# Patient Record
Sex: FEMALE | Race: ASIAN | Employment: FULL TIME | URBAN - METROPOLITAN AREA
[De-identification: names, ages, dates, MRNs, and addresses within clinical notes are randomized per-mention and may not be internally consistent; named-entity substitution may affect disease eponyms.]

---

## 2018-06-13 PROBLEM — D25.9 LEIOMYOMA OF BODY OF UTERUS: Status: ACTIVE | Noted: 2018-06-13

## 2019-04-23 ENCOUNTER — HOSPITAL ENCOUNTER (OUTPATIENT)
Dept: PHYSICAL THERAPY | Age: 39
Discharge: HOME | End: 2019-04-23
Payer: COMMERCIAL

## 2019-04-23 PROCEDURE — 97162 PT EVAL MOD COMPLEX 30 MIN: CPT
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
## 2019-04-25 ENCOUNTER — HOSPITAL ENCOUNTER (OUTPATIENT)
Dept: PHYSICAL THERAPY | Age: 39
Discharge: HOME | End: 2019-04-25
Payer: COMMERCIAL

## 2019-04-25 PROCEDURE — 97110 THERAPEUTIC EXERCISES: CPT

## 2019-04-25 PROCEDURE — 97014 ELECTRIC STIMULATION THERAPY: CPT

## 2019-04-25 PROCEDURE — 97140 MANUAL THERAPY 1/> REGIONS: CPT
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
## 2019-05-01 ENCOUNTER — HOSPITAL ENCOUNTER (OUTPATIENT)
Dept: PHYSICAL THERAPY | Age: 39
Discharge: HOME | End: 2019-05-01
Payer: COMMERCIAL

## 2019-05-01 PROCEDURE — 97140 MANUAL THERAPY 1/> REGIONS: CPT

## 2019-05-01 PROCEDURE — 97014 ELECTRIC STIMULATION THERAPY: CPT

## 2019-05-01 PROCEDURE — 97110 THERAPEUTIC EXERCISES: CPT
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
## 2019-05-01 NOTE — PROGRESS NOTES
PT DAILY TREATMENT NOTE - [facility]     Patient Name: Jimena Metcalf  Date:2019  : 1980  [x]  Patient  Verified  Payor: BLUE CROSS / Plan: Ricardo Sharp [id number] PPO / Product Type: PPO /    In time: 209  Out time: 1025  Total Treatment Time (min): 63  Total Timed Codes (min): 48  1:1 Treatment Time ( only): 45   Visit #: 3 of     Treatment Area: Low back pain [M54.5]  Neck pain [M54.2]    SUBJECTIVE  Pain Level IN:(0-10 scale):  6-7/10   Pain Level OUT: (0-10 scale) post treatment: 4/10 neck 0/10 low back    Any medication changes, allergies to medications, adverse drug reactions, diagnosis change, or new procedure performed?: [x] No    [] Yes (see summary sheet for update)  Subjective functional status/changes:   [] No changes reported  I definitely feel like the exercises are helping to decrease my pain     OBJECTIVE  Modalities Rationale:     decrease edema, decrease inflammation, decrease pain and increase tissue extensibility to improve patient's ability to perform ADL's without increase in pain  15 min [x] Estim, type/location:  Seated PREMOD to Upper trap and cervical paraspinals                                    []  att     [x]  unatt     []  w/US     []  w/ice    [x]  w/heat    min []  Mechanical Traction: type/lbs                   []  pro   []  sup   []  int   []  cont    []  before manual    []  after manual    min []  Ultrasound, settings/location:      min []  Iontophoresis w/ dexamethasone, location:                                               []  take home patch       []  in clinic    min []  Ice     []  Heat    location/position:     min []  Vasopneumatic Device, press/temp:     min []  Other:    [x] Skin assessment post-treatment (if applicable):    [x]  intact    []  redness- no adverse reaction     []redness - adverse reaction:        33/28  min Therapeutic Exercise:  [x] See flow sheet : 5 min NC for warm up     Rationale: increase ROM and increase strength to improve the patients ability to perform functional ADL's       15  min Manual Therapy:   Cervical spine PROM, DTM / MYOR along C PS, UTs, Scalenes B. SOR    Rationale: decrease pain, increase ROM, increase tissue extensibility and decrease trigger points to allow patient to tolerate ADL's without increase in pain. With   [] TE   [] TA   [] neuro   [] other: Patient Education: [x] Review HEP    [] Progressed/Changed HEP based on:   [] positioning   [] body mechanics   [] transfers   [] heat/ice application    [] other:      Objective/Functional Measures with Therapist Assessment Noted with Response to Therapy Session:  Patient demonstrated good recall of neck and back therex from last session  Large Trigger point along Right Superior border of scapula    ASSESSMENT/Changes in Function: Patient is responding well to therapeutic interventions with decreased pain reported post PT session. Patient will continue to benefit from skilled PT services to modify and progress therapeutic interventions, address functional mobility deficits, address ROM deficits, address strength deficits, analyze and address soft tissue restrictions and analyze and modify body mechanics/ergonomics to attain remaining goals. []  See Plan of Care  []  See progress note/recertification  []  See Discharge Summary         Progress towards goals / Updated goals:  1. Patient will demonstrate I and compliance with HEP to increase cervical ROM, increase scapular strength and improve core strength to demonstrate active role in rehab process.      · Long Term Goals: To be accomplished in  8-12  treatments:  1. Patient will increase AROM to Cervical spine to WNL with B side bending and rotation without an increase in pain to allow for increased ease for driving. 2. Patient will increase B scapular strength to 5/5 to allow for decreased pain with prolonged sitting and allow for proper postural alignment.    3. Patient will increase FOTO score to at least 63/100 to indicate improved I with functional mobility. 4. Patient will report decreased pain levels to 2/10 during and after activity to allow for return to PLOF. Ongoing 4/10 neck and 0/10 back at end of session 5/1/2019  5.  Patient will demonstrate 100% bridge and forward flexion therex without c/o increased pain.      PLAN  [x]  Upgrade activities as tolerated     [x]  Continue plan of care  []  Update interventions per flow sheet       []  Discharge due to:_  []  Other:_      Vickie May, PT 5/1/2019  10:10 AM    Future Appointments   Date Time Provider Aure Tapia   5/3/2019 11:30 AM Lenetta July, PTA [facility]   5/7/2019 11:30 AM Lenetta July, PTA [facility]   5/10/2019 12:15 PM Lenetta July, PTA [facility]   5/14/2019 10:00 AM Lenetta July, PTA [facility]   5/16/2019 10:00 AM Lenetta July, PTA [facility]   5/20/2019 10:00 AM Lenetta July, PTA [facility]   5/23/2019 10:00 AM Lenetta July, PTA [facility]   5/28/2019 10:00 AM Lenetta July, PTA [facility]   5/31/2019 11:30 AM Gaurav Patel, PTA [facility]

## 2019-05-03 ENCOUNTER — HOSPITAL ENCOUNTER (OUTPATIENT)
Dept: PHYSICAL THERAPY | Age: 39
Discharge: HOME | End: 2019-05-03
Payer: COMMERCIAL

## 2019-05-03 PROCEDURE — 97014 ELECTRIC STIMULATION THERAPY: CPT

## 2019-05-03 PROCEDURE — 97140 MANUAL THERAPY 1/> REGIONS: CPT

## 2019-05-03 PROCEDURE — 97110 THERAPEUTIC EXERCISES: CPT

## 2019-05-03 NOTE — PROGRESS NOTES
PT DAILY TREATMENT NOTE - [facility]     Patient Name: Vandana Longest  Date:5/3/2019  : 1980  [x]  Patient  Verified  Payor: BLUE CROSS / Plan: Ricardo Sharp [id number] PPO / Product Type: PPO /    In time: 11:40  Out time: 12:50  Total Treatment Time (min): 70  Total Timed Codes (min): 55  1:1 Treatment Time ( only): 50  Visit #: 4 of     Treatment Area: Low back pain [M54.5]  Neck pain [M54.2]    SUBJECTIVE  Pain Level IN:(0-10 scale):  5/10 neck; soreness for back   Pain Level OUT: (0-10 scale) post treatment: 3/10 neck and 0/10 back     Any medication changes, allergies to medications, adverse drug reactions, diagnosis change, or new procedure performed?: [x] No    [] Yes (see summary sheet for update)  Subjective functional status/changes:   [] No changes reported     I have this pain to the back of my head - both sides    OBJECTIVE  Modalities Rationale:     decrease edema, decrease inflammation, decrease pain and increase tissue extensibility to improve patient's ability to perform ADL's without increase in pain  15 min [x] Estim, type/location:  Seated PREMOD to Upper trap and cervical paraspinals                                    []  att     [x]  unatt     []  w/US     []  w/ice    [x]  w/heat    min []  Mechanical Traction: type/lbs                   []  pro   []  sup   []  int   []  cont    []  before manual    []  after manual    min []  Ultrasound, settings/location:      min []  Iontophoresis w/ dexamethasone, location:                                               []  take home patch       []  in clinic    min []  Ice     []  Heat    location/position:     min []  Vasopneumatic Device, press/temp:     min []  Other:    [x] Skin assessment post-treatment (if applicable):    [x]  intact    []  redness- no adverse reaction     []redness - adverse reaction:        40/35  min Therapeutic Exercise:  [x] See flow sheet : 5 min NC for warm up     Rationale: increase ROM and increase strength to improve the patients ability to perform functional ADL's       15  min Manual Therapy:   Cervical spine PROM, DTM / MYOR along C PS, UTs, Scalenes B. SOR    rotation lesion to C2-4 - performed MET to correct    Rationale: decrease pain, increase ROM, increase tissue extensibility and decrease trigger points to allow patient to tolerate ADL's without increase in pain. With   [] TE   [] TA   [] neuro   [] other: Patient Education: [x] Review HEP    [] Progressed/Changed HEP based on:   [] positioning   [] body mechanics   [] transfers   [] heat/ice application    [] other:      Objective/Functional Measures with Therapist Assessment Noted with Response to Therapy Session:  Patient presents with some tightness to (B) suboccipital/cervical paraspinals   And rotation lesion to C2-4 - performed MET to correct         ASSESSMENT/Changes in Function:  Patient will continue to benefit from skilled PT services to modify and progress therapeutic interventions, address functional mobility deficits, address ROM deficits, address strength deficits, analyze and address soft tissue restrictions and analyze and modify body mechanics/ergonomics to attain remaining goals. []  See Plan of Care  [x]  See progress note/recertification  []  See Discharge Summary         Progress towards goals / Updated goals:  1. Patient will demonstrate I and compliance with HEP to increase cervical ROM, increase scapular strength and improve core strength to demonstrate active role in rehab process.      · Long Term Goals: To be accomplished in  8-12  treatments:  1. Patient will increase AROM to Cervical spine to WNL with B side bending and rotation without an increase in pain to allow for increased ease for driving. 2. Patient will increase B scapular strength to 5/5 to allow for decreased pain with prolonged sitting and allow for proper postural alignment.    3. Patient will increase FOTO score to at least 63/100 to indicate improved I with functional mobility. 4. Patient will report decreased pain levels to 2/10 during and after activity to allow for return to PLOF. Ongoing 4/10 neck and 0/10 back at end of session 5/1/2019  5.  Patient will demonstrate 100% bridge and forward flexion therex without c/o increased pain.      PLAN  [x]  Upgrade activities as tolerated     [x]  Continue plan of care  []  Update interventions per flow sheet       []  Discharge due to:_  []  Other:_      Ghulam Calderon PTA 5/3/2019  10:10 AM    Future Appointments   Date Time Provider Aure Tapia   5/7/2019 11:30 AM Michael Swan PTA [facility]   5/10/2019 12:15 PM Michael Swan PTA [facility]   5/14/2019 10:00 AM Michael Swan PTA [facility]   5/16/2019 10:00 AM Michael Swna PTA [facility]   5/20/2019 10:00 AM Michael Swan PTA [facility]   5/23/2019 10:00 AM Michael Swan PTA [facility]   5/28/2019 10:00 AM Michael Swan PTA [facility]   5/31/2019 11:30 AM Sincere Ruiz PTA [facility]

## 2019-05-07 ENCOUNTER — HOSPITAL ENCOUNTER (OUTPATIENT)
Dept: PHYSICAL THERAPY | Age: 39
Discharge: HOME | End: 2019-05-07
Payer: COMMERCIAL

## 2019-05-07 PROCEDURE — 97140 MANUAL THERAPY 1/> REGIONS: CPT

## 2019-05-07 PROCEDURE — 97110 THERAPEUTIC EXERCISES: CPT

## 2019-05-07 PROCEDURE — 97014 ELECTRIC STIMULATION THERAPY: CPT

## 2019-05-07 NOTE — PROGRESS NOTES
PT DAILY TREATMENT NOTE - [facility]     Patient Name: Jeancarlos Jain  Date:2019  : 1980  [x]  Patient  Verified  Payor: BLUE CROSS / Plan: [facility] PPO / Product Type: PPO /    In time:11:40  Out time:12:50  Total Treatment Time (min): 70  Total Timed Codes (min): 55  1:1 Treatment Time ( only):  50  Visit #: 5 of     Treatment Area: Low back pain [M54.5]  Neck pain [M54.2]    SUBJECTIVE  Pain Level IN:(0-10 scale): 6/10 in neck and soreness in LB  Pain Level OUT: (0-10 scale) post treatment: 5/10 in neck, 1/10 in LB    Any medication changes, allergies to medications, adverse drug reactions, diagnosis change, or new procedure performed?: [x] No    [] Yes (see summary sheet for update)  Subjective functional status/changes:   [] No changes reported  Patient states she has achy pain mostly on right side of neck, and soreness in LB    OBJECTIVE    Modalities Rationale:     decrease edema, decrease inflammation, decrease pain and increase tissue extensibility to improve patient's ability to perform ADL's without increase in pain  15 min [x] Estim, type/location:  PREMOD to bilat UT and cervical paraspinals                                    []  att     [x]  unatt     []  w/US     []  w/ice    [x]  w/heat    min []  Mechanical Traction: type/lbs                   []  pro   []  sup   []  int   []  cont    []  before manual    []  after manual    min []  Ultrasound, settings/location:      min []  Iontophoresis w/ dexamethasone, location:                                               []  take home patch       []  in clinic    min []  Ice     []  Heat    location/position:     min []  Vasopneumatic Device, press/temp:     min []  Other:    [] Skin assessment post-treatment (if applicable):    []  intact    []  redness- no adverse reaction     []redness - adverse reaction:        40/35 min Therapeutic Exercise:  [x] See flow sheet : 5 min NC for warmup   Rationale: increase ROM and increase strength to improve the patients ability to perform functional ADL's      15 min Manual Therapy:  MET to correct SI alignment, MET to correct left rotational lesion to C2-4. D/STM to UT, LS, SCM, and cervical paraspinals. TPR to bilat UT and right medial scap border   Rationale: decrease pain, increase ROM, increase tissue extensibility and decrease trigger points to tolerate ADL's without increase in pain              With   [] TE   [] TA   [] neuro   [] other: Patient Education: [x] Review HEP    [] Progressed/Changed HEP based on:   [] positioning   [] body mechanics   [] transfers   [] heat/ice application    [] other:      Objective/Functional Measures with Therapist Assessment Noted with Response to Therapy Session:  Increased repetitions in therex program. Good tolerance from patient, no increase in pain. Upon assessment of SI alignment, patient presented anteriorly rotated on left, posteriorly rotated on right. Corrected with MET during manual.  Patient presented with left rotational lesion of C2-4, corrected with MET  Patient still presenting with increase tightness/ tension in cervical paraspinals, UT, LS, and SCM, as well as trigger points in bilat UT and along medial border of right scap  Goal checked: 1. Patient will demonstrate I and compliance with HEP to increase cervical ROM, increase scapular strength and improve core strength to demonstrate active role in rehab process. Partial compliance. 5/7/2019    ASSESSMENT/Changes in Function:   Patient will continue to benefit from skilled PT services to modify and progress therapeutic interventions, address functional mobility deficits, address ROM deficits, address strength deficits, analyze and address soft tissue restrictions, analyze and cue movement patterns and analyze and modify body mechanics/ergonomics to attain remaining goals.      [x]  See Plan of Care  []  See progress note/recertification  []  See Discharge Summary         Progress towards goals / Updated goals:  1. Patient will demonstrate I and compliance with HEP to increase cervical ROM, increase scapular strength and improve core strength to demonstrate active role in rehab process. Partial compliance. 5/7/2019     · Long Term Goals: To be accomplished in  8-12  treatments:  1. Patient will increase AROM to Cervical spine to WNL with B side bending and rotation without an increase in pain to allow for increased ease for driving. 2. Patient will increase B scapular strength to 5/5 to allow for decreased pain with prolonged sitting and allow for proper postural alignment. 3. Patient will increase FOTO score to at least 63/100 to indicate improved I with functional mobility. 4. Patient will report decreased pain levels to 2/10 during and after activity to allow for return to PLOF. Ongoing 4/10 neck and 0/10 back at end of session 5/1/2019  5.  Patient will demonstrate 100% bridge and forward flexion therex without c/o increased pain.         PLAN  [x]  Upgrade activities as tolerated     [x]  Continue plan of care  []  Update interventions per flow sheet       []  Discharge due to:_  []  Other:_      Rubin Taveras 5/7/2019  12:23 PM    Future Appointments   Date Time Provider Aure Tapia   5/10/2019 12:15 PM Michael Swan PTA [facility]   5/14/2019 10:00 AM Michael Swan PTA [facility]   5/16/2019 10:00 AM Michael Swan PTA [facility]   5/20/2019 10:00 AM Michael Swan PTA [facility]   5/23/2019 10:00 AM Michael Swan PTA [facility]   5/28/2019 10:00 AM Michael Swan PTA [facility]   5/31/2019 11:30 AM Sincere Ruiz PTA [facility]

## 2019-05-10 ENCOUNTER — HOSPITAL ENCOUNTER (OUTPATIENT)
Dept: PHYSICAL THERAPY | Age: 39
End: 2019-05-10
Payer: COMMERCIAL

## 2019-05-14 ENCOUNTER — HOSPITAL ENCOUNTER (OUTPATIENT)
Dept: PHYSICAL THERAPY | Age: 39
Discharge: HOME | End: 2019-05-14
Payer: COMMERCIAL

## 2019-05-14 PROCEDURE — 97014 ELECTRIC STIMULATION THERAPY: CPT

## 2019-05-14 PROCEDURE — 97140 MANUAL THERAPY 1/> REGIONS: CPT

## 2019-05-14 PROCEDURE — 97110 THERAPEUTIC EXERCISES: CPT

## 2019-05-14 NOTE — PROGRESS NOTES
PT DAILY TREATMENT NOTE - [facility]     Patient Name: Vandana Longest  Date:2019  : 1980  [x]  Patient  Verified  Payor: BLUE CROSS / Plan: Ricardo Sharp [id number] PPO / Product Type: PPO /    In time:10:05  Out time:11:15  Total Treatment Time (min): 70  Total Timed Codes (min): 55  1:1 Treatment Time ( only):  45  Visit #: 6 of     Treatment Area: Low back pain [M54.5]  Neck pain [M54.2]    SUBJECTIVE  Pain Level IN:(0-10 scale): 4-5/10 in neck and soreness in LB  Pain Level OUT: (0-10 scale) post treatment: 2/10 in neck and 0/10 LB    Any medication changes, allergies to medications, adverse drug reactions, diagnosis change, or new procedure performed?: [x] No    [] Yes (see summary sheet for update)  Subjective functional status/changes:   [] No changes reported  It is really sore over the (R) side of my neck more than the left and my back is sore  OBJECTIVE    Modalities Rationale:     decrease edema, decrease inflammation, decrease pain and increase tissue extensibility to improve patient's ability to perform ADL's without increase in pain  15 min [x] Estim, type/location:  PREMOD to bilat UT and cervical paraspinals                                    []  att     [x]  unatt     []  w/US     []  w/ice    [x]  w/heat    min []  Mechanical Traction: type/lbs                   []  pro   []  sup   []  int   []  cont    []  before manual    []  after manual    min []  Ultrasound, settings/location:      min []  Iontophoresis w/ dexamethasone, location:                                               []  take home patch       []  in clinic    min []  Ice     []  Heat    location/position:     min []  Vasopneumatic Device, press/temp:     min []  Other:    [] Skin assessment post-treatment (if applicable):    []  intact    []  redness- no adverse reaction     []redness - adverse reaction:        40/35 min Therapeutic Exercise:  [x] See flow sheet : 5 min NC for warmup  Added piriformis stretch   [place] and band with bridge  2 way clams   Serratus punches in side lying at 90 (#2) and 120 (0#)   t-band rows and extension with blue t-band   Levator scapula stretch and scalenus stretch     Rationale: increase ROM and increase strength to improve the patients ability to perform functional ADL's      15 min Manual Therapy:  MET to correct SI alignment, MET to correct left rotational lesion to C2-4. D/STM to UT, LS, SCM, and cervical paraspinals. TPR to bilat UT and right medial scap border   Rationale: decrease pain, increase ROM, increase tissue extensibility and decrease trigger points to tolerate ADL's without increase in pain              With   [] TE   [] TA   [] neuro   [] other: Patient Education: [x] Review HEP    [] Progressed/Changed HEP based on:   [] positioning   [] body mechanics   [] transfers   [] heat/ice application    [] other:      Objective/Functional Measures with Therapist Assessment Noted with Response to Therapy Session:  See above increase exercise program to increase core stability/strength and increase flexibility and cervical movement with additional stretches and exercises  Patient continues to present with a slight cervical rotation lesion to the (R) to C2-4 and tightness to the (R) cervical paraspinals and SCM/scalenus     ASSESSMENT/Changes in Function:   Patient will continue to benefit from skilled PT services to modify and progress therapeutic interventions, address functional mobility deficits, address ROM deficits, address strength deficits, analyze and address soft tissue restrictions, analyze and cue movement patterns and analyze and modify body mechanics/ergonomics to attain remaining goals. [x]  See Plan of Care  []  See progress note/recertification  []  See Discharge Summary         Progress towards goals / Updated goals:  1.  Patient will demonstrate I and compliance with HEP to increase cervical ROM, increase scapular strength and improve core strength to demonstrate active role in rehab process. Partial compliance. 5/7/2019     · Long Term Goals: To be accomplished in  8-12  treatments:  1. Patient will increase AROM to Cervical spine to WNL with B side bending and rotation without an increase in pain to allow for increased ease for driving. 2. Patient will increase B scapular strength to 5/5 to allow for decreased pain with prolonged sitting and allow for proper postural alignment. 3. Patient will increase FOTO score to at least 63/100 to indicate improved I with functional mobility. 4. Patient will report decreased pain levels to 2/10 during and after activity to allow for return to PLOF. Ongoing 4/10 neck and 0/10 back at end of session 5/1/2019  5.  Patient will demonstrate 100% bridge and forward flexion therex without c/o increased pain.         PLAN  [x]  Upgrade activities as tolerated     [x]  Continue plan of care  []  Update interventions per flow sheet       []  Discharge due to:_  []  Other:_      Concepcion Caldwell PTA 5/14/2019  12:23 PM    Future Appointments   Date Time Provider Aure Tapia   5/16/2019 10:00 AM Marcelina White [facility]   5/20/2019 10:00 AM Marcelina White PTA [facility]   5/23/2019 10:00 AM Marcelina White PTA [facility]   5/28/2019 10:00 AM Marcelina White PTA [facility]   5/31/2019 11:30 AM Franklin Bertrandy [place]

## 2019-05-16 ENCOUNTER — HOSPITAL ENCOUNTER (OUTPATIENT)
Dept: PHYSICAL THERAPY | Age: 39
Discharge: HOME | End: 2019-05-16
Payer: COMMERCIAL

## 2019-05-16 PROCEDURE — 97140 MANUAL THERAPY 1/> REGIONS: CPT

## 2019-05-16 PROCEDURE — 97110 THERAPEUTIC EXERCISES: CPT

## 2019-05-16 PROCEDURE — 97014 ELECTRIC STIMULATION THERAPY: CPT

## 2019-05-16 NOTE — PROGRESS NOTES
PT DAILY TREATMENT NOTE - [facility]     Patient Name: Deborah Roman  Date:2019  : 1980  [x]  Patient  Verified  Payor: BLUE CROSS / Plan: Ricardo Sharp [id number] PPO / Product Type: PPO /    In time:10:00  Out time:11:10  Total Treatment Time (min): 70  Total Timed Codes (min):   1:1 Treatment Time ( only):    Visit #: 7 of     Treatment Area: Low back pain [M54.5]  Neck pain [M54.2]    SUBJECTIVE  Pain Level IN:(0-10 scale): 3/10 in neck and soreness in LB  Pain Level OUT: (0-10 scale) post treatment: 2/10 in neck and 0/10 LB    Any medication changes, allergies to medications, adverse drug reactions, diagnosis change, or new procedure performed?: [x] No    [] Yes (see summary sheet for update)  Subjective functional status/changes:   [] No changes reported  It feels better after I leave here but then it comes back   OBJECTIVE    Modalities Rationale:     decrease edema, decrease inflammation, decrease pain and increase tissue extensibility to improve patient's ability to perform ADL's without increase in pain  15 min [x] Estim, type/location:  PREMOD to bilat UT and cervical paraspinals                                    []  att     [x]  unatt     []  w/US     []  w/ice    [x]  w/heat    min []  Mechanical Traction: type/lbs                   []  pro   []  sup   []  int   []  cont    []  before manual    []  after manual    min []  Ultrasound, settings/location:      min []  Iontophoresis w/ dexamethasone, location:                                               []  take home patch       []  in clinic    min []  Ice     []  Heat    location/position:     min []  Vasopneumatic Device, press/temp:     min []  Other:    [] Skin assessment post-treatment (if applicable):    []  intact    []  redness- no adverse reaction     []redness - adverse reaction:        40/35 min Therapeutic Exercise:  [x] See flow sheet : 5 min NC for warmup       Rationale: increase ROM and increase strength to improve the patients ability to perform functional ADL's      15 min Manual Therapy:  MET to correct left rotational lesion to C2-4. D/STM to UT, LS, SCM, and cervical paraspinals. TPR to bilat UT (L)<(R)   Rationale: decrease pain, increase ROM, increase tissue extensibility and decrease trigger points to tolerate ADL's without increase in pain              With   [] TE   [] TA   [] neuro   [] other: Patient Education: [x] Review HEP    [] Progressed/Changed HEP based on:   [] positioning   [] body mechanics   [] transfers   [] heat/ice application    [] other:      Objective/Functional Measures with Therapist Assessment Noted with Response to Therapy Session:  Patient presented with pain with passive cervical rotation to the (R) - increase with palpation over the (L) approx C3 - performed MET to the left - then patient was able to achieve greater passive right rotation. AROM  (R) lateral SBing 40  (L) lateral SBing 40 with pain   Ext 45 with pain  Flex 15  (R) rotation 55 and (L) 60 rotation    ASSESSMENT/Changes in Function:   Patient will continue to benefit from skilled PT services to modify and progress therapeutic interventions, address functional mobility deficits, address ROM deficits, address strength deficits, analyze and address soft tissue restrictions, analyze and cue movement patterns and analyze and modify body mechanics/ergonomics to attain remaining goals. [x]  See Plan of Care  []  See progress note/recertification  []  See Discharge Summary         Progress towards goals / Updated goals:  1. Patient will demonstrate I and compliance with HEP to increase cervical ROM, increase scapular strength and improve core strength to demonstrate active role in rehab process. Partial compliance. 5/7/2019     · Long Term Goals: To be accomplished in  8-12  treatments:  1.  Patient will increase AROM to Cervical spine to WNL with B side bending and rotation without an increase in pain to allow for increased ease for driving. (R) lateral SBing 40  (L) lateral SBing 40 with pain   Ext 45 with pain  Flex 15  (R) rotation 55 and (L) 60 rotation  5/16/19  2. Patient will increase B scapular strength to 5/5 to allow for decreased pain with prolonged sitting and allow for proper postural alignment. 3. Patient will increase FOTO score to at least 63/100 to indicate improved I with functional mobility. 4. Patient will report decreased pain levels to 2/10 during and after activity to allow for return to PLOF. Ongoing 4/10 neck and 0/10 back at end of session 5/1/2019  5.  Patient will demonstrate 100% bridge and forward flexion therex without c/o increased pain.         PLAN  [x]  Upgrade activities as tolerated     [x]  Continue plan of care  []  Update interventions per flow sheet       []  Discharge due to:_  []  Other:_      Rod Fox PTA 5/16/2019  12:23 PM    Future Appointments   Date Time Provider Aure Tapia   5/20/2019 10:00 AM Ricky Blevins PTA [facility]   5/23/2019 10:00 AM Ricky Blevins PTA [facility]   5/28/2019 10:00 AM Ricky Blevins PTA [facility]   5/31/2019 11:30 AM Brian Choi PTA [facility]

## 2019-05-20 ENCOUNTER — HOSPITAL ENCOUNTER (OUTPATIENT)
Dept: PHYSICAL THERAPY | Age: 39
Discharge: HOME | End: 2019-05-20
Payer: COMMERCIAL

## 2019-05-20 PROCEDURE — 97110 THERAPEUTIC EXERCISES: CPT

## 2019-05-20 PROCEDURE — 97014 ELECTRIC STIMULATION THERAPY: CPT

## 2019-05-20 PROCEDURE — 97140 MANUAL THERAPY 1/> REGIONS: CPT

## 2019-05-20 NOTE — PROGRESS NOTES
PT DAILY TREATMENT NOTE - [facility]     Patient Name: Aidee Vuong  Date:2019  : 1980  [x]  Patient  Verified  Payor: BLUE [employer] / Plan: Ricardo Sharp [id number] PPO / Product Type: PPO /    In time:10:10  Out time:11:20  Total Treatment Time (min): 70  Total Timed Codes (min): 55  1:1 Treatment Time ( only):  50  Visit #: 8 of     Treatment Area: Low back pain [M54.5]  Neck pain [M54.2]    SUBJECTIVE  Pain Level IN:(0-10 scale): 3/10 neck  Pain Level OUT: (0-10 scale) post treatment: 1/10    Any medication changes, allergies to medications, adverse drug reactions, diagnosis change, or new procedure performed?: [x] No    [] Yes (see summary sheet for update)  Subjective functional status/changes:   [] No changes reported  I did not do my exercises this weekend - I need to be better with doing them  OBJECTIVE    Modalities Rationale:     decrease edema, decrease inflammation, decrease pain and increase tissue extensibility to improve patient's ability to perform ADL's without increase in pain  15 min [x] Estim, type/location:  PREMOD to bilat UT and cervical paraspinals                                    []  att     [x]  unatt     []  w/US     []  w/ice    [x]  w/heat    min []  Mechanical Traction: type/lbs                   []  pro   []  sup   []  int   []  cont    []  before manual    []  after manual    min []  Ultrasound, settings/location:      min []  Iontophoresis w/ dexamethasone, location:                                               []  take home patch       []  in clinic    min []  Ice     []  Heat    location/position:     min []  Vasopneumatic Device, press/temp:     min []  Other:    [] Skin assessment post-treatment (if applicable):    []  intact    []  redness- no adverse reaction     []redness - adverse reaction:        40/35 min Therapeutic Exercise:  [x] See flow sheet : 5 min NC for warmup       Rationale: increase ROM and increase strength to improve the patients ability to perform functional ADL's      15 min Manual Therapy:  MET to correct left rotational lesion to C2-4. D/STM to UT, LS, SCM, and cervical paraspinals. TPR to bilat UT (L)<(R)   Rationale: decrease pain, increase ROM, increase tissue extensibility and decrease trigger points to tolerate ADL's without increase in pain              With   [] TE   [] TA   [] neuro   [] other: Patient Education: [x] Review HEP    [] Progressed/Changed HEP based on:   [] positioning   [] body mechanics   [] transfers   [] heat/ice application    [] other:      Objective/Functional Measures with Therapist Assessment Noted with Response to Therapy Session:  Patient continues to present with some painful cervical rotation lesion on the (L) side - able to correct with MET - however patient reports non compliance with her HEP and patient need to improve her postural awareness secondary to sitting with forward head and rounded shoulder while using her cellphone    ASSESSMENT/Changes in Function:   Patient will continue to benefit from skilled PT services to modify and progress therapeutic interventions, address functional mobility deficits, address ROM deficits, address strength deficits, analyze and address soft tissue restrictions, analyze and cue movement patterns and analyze and modify body mechanics/ergonomics to attain remaining goals. [x]  See Plan of Care  []  See progress note/recertification  []  See Discharge Summary         Progress towards goals / Updated goals:  1. Patient will demonstrate I and compliance with HEP to increase cervical ROM, increase scapular strength and improve core strength to demonstrate active role in rehab process. Partial compliance. 5/7/2019     · Long Term Goals: To be accomplished in  8-12  treatments:  1. Patient will increase AROM to Cervical spine to WNL with B side bending and rotation without an increase in pain to allow for increased ease for driving.   (R) lateral SBing 40  (L) lateral SBing 40 with pain   Ext 45 with pain  Flex 15  (R) rotation 55 and (L) 60 rotation  5/16/19  2. Patient will increase B scapular strength to 5/5 to allow for decreased pain with prolonged sitting and allow for proper postural alignment. 3. Patient will increase FOTO score to at least 63/100 to indicate improved I with functional mobility. 4. Patient will report decreased pain levels to 2/10 during and after activity to allow for return to PLOF. Ongoing 4/10 neck and 0/10 back at end of session 5/1/2019  5.  Patient will demonstrate 100% bridge and forward flexion therex without c/o increased pain.         PLAN  [x]  Upgrade activities as tolerated     [x]  Continue plan of care  []  Update interventions per flow sheet       []  Discharge due to:_  []  Other:_      Dylan Adams PTA 5/20/2019  12:23 PM    Future Appointments   Date Time Provider Aure Tapia   5/23/2019 10:00 AM Marcelo Oliva PTA [facility]   5/28/2019 10:00 AM Marcelo Oliva PTA [facility]   5/31/2019 11:30 AM Connor Ayala PTA [facility]

## 2019-05-23 ENCOUNTER — HOSPITAL ENCOUNTER (OUTPATIENT)
Dept: PHYSICAL THERAPY | Age: 39
Discharge: HOME | End: 2019-05-23
Payer: COMMERCIAL

## 2019-05-23 PROCEDURE — 97110 THERAPEUTIC EXERCISES: CPT

## 2019-05-23 PROCEDURE — 97140 MANUAL THERAPY 1/> REGIONS: CPT

## 2019-05-23 PROCEDURE — 97014 ELECTRIC STIMULATION THERAPY: CPT

## 2019-05-23 NOTE — PROGRESS NOTES
PT DAILY TREATMENT NOTE - [facility]     Patient Name: Paulino Wilkerson  Date:2019  : 1980  [x]  Patient  Verified  Payor: BLUE [employer] / Plan: Ricardo Sharp [id number] PPO / Product Type: PPO /    In time:10:10  Out time:11:10  Total Treatment Time (min): 60  Total Timed Codes (min): 45  1:1 Treatment Time ( only):  40  Visit #: 9 of 12    Treatment Area: Low back pain [M54.5]  Neck pain [M54.2]    SUBJECTIVE  Pain Level IN:(0-10 scale): 4-5/10  Pain Level OUT: (0-10 scale) post treatment: 2-3/10    Any medication changes, allergies to medications, adverse drug reactions, diagnosis change, or new procedure performed?: [x] No    [] Yes (see summary sheet for update)  Subjective functional status/changes:   [] No changes reported  Patient states her neck is sore, pain gets worse when turning head both ways. Patient states she has improved 60% since beginning therapy. She states she feels better after PT but pain seems to come back the next day.  Still having difficulty turning or leaning head and lifting/carrying objects     OBJECTIVE    Modalities Rationale:     decrease edema, decrease inflammation, decrease pain and increase tissue extensibility to improve patient's ability to perform ADL's without increase in pain  15 min [x] Estim, type/location: PREMOD to bilat UT and cervical paraspinals                                     []  att     [x]  unatt     []  w/US     []  w/ice    [x]  w/heat    min []  Mechanical Traction: type/lbs                   []  pro   []  sup   []  int   []  cont    []  before manual    []  after manual    min []  Ultrasound, settings/location:      min []  Iontophoresis w/ dexamethasone, location:                                               []  take home patch       []  in clinic    min []  Ice     []  Heat    location/position:     min []  Vasopneumatic Device, press/temp:     min []  Other:    [] Skin assessment post-treatment (if applicable):    []  intact    []  redness- no adverse reaction     []redness - adverse reaction:        5 min Therapeutic Exercise:  [x] See flow sheet : 5 min NC for warmup   Rationale: increase ROM and increase strength to improve the patients ability to perform functional ADL's      15 min Manual Therapy:     Rationale: decrease pain, increase ROM, increase tissue extensibility and decrease trigger points to toleratw ADL's without increase in pain            25 With   [x] TE   [] TA   [] neuro   [] other: Patient Education: [] Review HEP    [] Progressed/Changed HEP based on:   [] positioning   [] body mechanics   [] transfers   [] heat/ice application    [x] other: Reassessment and FOTO     Objective/Functional Measures with Therapist Assessment Noted with Response to Therapy Session:    On Evaluation:  Flexion 15 * w pain , Extension 30 *, Left sidebending 50 *, Right sidebending 30 *, Left rotation 70 *, Right rotation 35 *. Today:  Flexion 30 , Extension 45 , Left sidebending 40 , Right sidebending 35 , Left rotation 60, Right rotation 58 . MMT: Bilat UT 4/5 Bilat MT and LT 3/5. Patient able to perform bridge with 100% ROM unable to maintain with manual resistance  FOTO 58/100    ASSESSMENT/Changes in Function:   Patient will continue to benefit from skilled PT services to modify and progress therapeutic interventions, address functional mobility deficits, address ROM deficits, address strength deficits, analyze and address soft tissue restrictions, analyze and cue movement patterns and analyze and modify body mechanics/ergonomics to attain remaining goals. []  See Plan of Care  [x]  See progress note/recertification   []  See Discharge Summary         Progress towards goals / Updated goals:  1. Patient will demonstrate I and compliance with HEP to increase cervical ROM, increase scapular strength and improve core strength to demonstrate active role in rehab process. Partial compliance. 5/7/2019     · Long Term Goals:  To be accomplished in  8-12  treatments:  1. Patient will increase AROM to Cervical spine to WNL with B side bending and rotation without an increase in pain to allow for increased ease for driving. Progressing Flexion 30 , Extension 45 , Left sidebending 40 , Right sidebending 35 , Left rotation 60, Right rotation 58  5/23/2019.  2. Patient will increase B scapular strength to 5/5 to allow for decreased pain with prolonged sitting and allow for proper postural alignment. Progressing Bilat UT 4/5 Bilat MT and LT 3/5.  3. Patient will increase FOTO score to at least 63/100 to indicate improved I with functional mobility. Progressing 58/100  4. Patient will report decreased pain levels to 2/10 during and after activity to allow for return to PLOF. Pain levels 2-3 post therapy 5/24/2019  5. Patient will demonstrate 100% bridge and forward flexion therex without c/o increased pain. Patient able to perform 100% bridge, unable to hold against manual resistance      PLAN  [x]  Upgrade activities as tolerated     [x]  Continue plan of care  []  Update interventions per flow sheet       []  Discharge due to:_  [x]  Other:_recommand patient follow up with MD secondary to limited progress made with therapy and for patient to contact office regarding any additional PT that is recommended or advised.       Nasrin Baird 5/23/2019  2:30 PM    Future Appointments   Date Time Provider Aure Tapia   5/28/2019 10:00 AM Jorge L White PTA [facility]   5/31/2019 11:30 AM Elizabeth Perkins PTA [facility]

## 2019-05-23 NOTE — PROGRESS NOTES
[address] [facility] AT [facility]  [address] [place] [address],[address], [place], [place]  Phone [phone]  Fax [phone]  PROGRESS NOTE  Patient Name: Kristin Rosas : 1980   Treatment/Medical Diagnosis: Low back pain [M54.5]  Neck pain [M54.2]   Referral Source: Beverly Jolley MD     Date of Initial Visit: 2019 Attended Visits: 9 Missed Visits: 1     SUMMARY OF TREATMENT  Patient has received physical therapy for neck and back pain since 2019. Treatment has included therapeutic stretching, strengthen, activities, manual/massage and modalities as need to assist with decreasing pain and increasing function. CURRENT STATUS  Patient has completed 9 visits  Patient reports overall 60% improvement since beginning therapy  FOTO (Functional Status Summary)  score is 58/100 was 52/100 initial evaluation - indication of overall functional improvement. AROM cervical spine:   On Evaluation:  Flexion 15 * w pain , Extension 30 *, Left sidebending 50 *, Right sidebending 30 *, Left rotation 70 *, Right rotation 35 *.    Today:  Flexion 30 , Extension 45 , Left sidebending 40 , Right sidebending 35 , Left rotation 60, Right rotation 58 . MMT: Bilat UT 4/5 Bilat MT and LT 3/5. Patient able to perform bridge with 100% ROM unable to maintain with manual resistance    · Long Term Goals: To be accomplished in  8-12  treatments:  1. Patient will increase AROM to Cervical spine to WNL with B side bending and rotation without an increase in pain to allow for increased ease for driving. Progressing Flexion 30 , Extension 45 , Left sidebending 40 , Right sidebending 35 , Left rotation 60, Right rotation 58  2019.  2. Patient will increase B scapular strength to 5/5 to allow for decreased pain with prolonged sitting and allow for proper postural alignment.  Progressing Bilat UT 4/5 Bilat MT and LT 3/5.  3. Patient will increase FOTO score to at least 63/100 to indicate improved I with functional mobility. Progressing 58/100  4. Patient will report decreased pain levels to 2/10 during and after activity to allow for return to PLOF. Pain levels 2-3 post therapy 5/24/2019  5. Patient will demonstrate 100% bridge and forward flexion therex without c/o increased pain. Patient able to perform 100% bridge, unable to hold against manual resistance      Patient's remaining chief c/o is pain coming back the day after therapy    Progress towards goals / Updated goals:  1. Patient will demonstrate I and compliance with HEP to increase cervical ROM, increase scapular strength and improve core strength to demonstrate active role in rehab process. Partial compliance. 5/7/2019     New Goals to be achieved in 4 weeks:  Long-term Goals: 4 weeks  1. Patient will increase AROM to Cervical spine to WNL with B side bending and rotation without an increase in pain to allow for increased ease for driving. Progress Note (5/23/2019):  right rotation 58 degrees left rotation 60 degrees right lateral flexion 35 left lateral flexion 40 extension 45 flexion 30       Current:     2. Patient will increase B scapular strength to 5/5 to allow for decreased pain with prolonged sitting and allow for proper postural alignment      Progress Note (5/23/2019): Bilat UT 4/5 Bilat MT and LT 3/5. Current:     3. Patient will increase FOTO score to at least 63/100 to indicate improved I with functional mobility     Progress Note (5/23/2019): 58/100      Current:     RECOMMENDATIONS  Recommand patient follow up with MD secondary to limited progress made with therapy and for patient to contact office regarding any additional PT that is recommended or advised  If MD recommend continued PT after follow up with patient:  Continue with above POC for 2 to 3 times a week for 4 weeks to achieve above goals    If you have any questions/comments please contact us directly at [phone].    Thank you for allowing us to assist in the care of your patient. Therapist Signature: Yony Loredo SPTA  Date: 5/23/2019    Jazlyn PEÑA Time: 7:05 PM   NOTE TO PHYSICIAN:  PLEASE COMPLETE THE ORDERS BELOW AND FAX TO   [facility]: [phone]. If you are unable to process this request in 24 hours please contact our office: [phone].    ___ I have read the above report and request that my patient continue as recommended.   ___ I have read the above report and request that my patient continue therapy with the following changes/special instructions:_________________________________________________________   ___ I have read the above report and request that my patient be discharged from therapy.      Physician Signature:        Date:       Time:

## 2019-05-28 ENCOUNTER — APPOINTMENT (OUTPATIENT)
Dept: PHYSICAL THERAPY | Age: 39
End: 2019-05-28
Payer: COMMERCIAL

## 2019-05-31 ENCOUNTER — APPOINTMENT (OUTPATIENT)
Dept: PHYSICAL THERAPY | Age: 39
End: 2019-05-31
Payer: COMMERCIAL

## 2019-07-09 NOTE — PROGRESS NOTES
[address] [facility] AT [facility]  [address], [place]  Phone [phone]  Fax  SUMMARY  Patient Name: Erin Alcala : 1980   Treatment/Medical Diagnosis: Low back pain [M54.5]  Neck pain [M54.2]   Referral Source: Franko Cochran MD     Date of Initial Visit: 19 Attended Visits: 9 Missed Visits: 0       Summary of Care: Thank you for referring this pleasant patient. Alice Mohs has completed 9 visits    Please refer to progress report written on 19 for status of patient at that time on last visit. Stated will keep the chart open for an additional month if patient required or needed additional PT secondary to not being to management symptoms with HEP  Patient did not contact office for any additional need or follow up visits -   Will discharge patient at this time and if patient contact office after today will advise patient that is any additional follow up or recommendation is needed to return to referring physician. Reason for Discharge:  [] The patient was non-compliant with attendance. [x] The patient could not be contacted to schedule further therapy sessions. [] The patient has been discharged based on the orders of the referring physician.  [] The patient has requested to be discharged due to:   [] Patient has met all goals or max benefit has been achieved      If you have any questions/comments please contact us directly at [phone]. Thank you for allowing us to assist in the care of your patient.     Therapist Signature: Ashley Bowers PTA Date: 19     Time: 7:26 AM